# Patient Record
Sex: FEMALE | Race: AMERICAN INDIAN OR ALASKA NATIVE
[De-identification: names, ages, dates, MRNs, and addresses within clinical notes are randomized per-mention and may not be internally consistent; named-entity substitution may affect disease eponyms.]

---

## 2019-11-19 ENCOUNTER — HOSPITAL ENCOUNTER (EMERGENCY)
Dept: HOSPITAL 5 - ED | Age: 29
Discharge: HOME | End: 2019-11-19
Payer: MEDICAID

## 2019-11-19 VITALS — SYSTOLIC BLOOD PRESSURE: 114 MMHG | DIASTOLIC BLOOD PRESSURE: 82 MMHG

## 2019-11-19 DIAGNOSIS — O26.893: Primary | ICD-10-CM

## 2019-11-19 DIAGNOSIS — R51: ICD-10-CM

## 2019-11-19 DIAGNOSIS — Z3A.33: ICD-10-CM

## 2019-11-19 DIAGNOSIS — Z79.899: ICD-10-CM

## 2019-11-19 PROCEDURE — 99282 EMERGENCY DEPT VISIT SF MDM: CPT

## 2019-11-19 PROCEDURE — 96374 THER/PROPH/DIAG INJ IV PUSH: CPT

## 2019-11-19 PROCEDURE — 96375 TX/PRO/DX INJ NEW DRUG ADDON: CPT

## 2025-02-03 NOTE — EMERGENCY DEPARTMENT REPORT
ED Headache HPI





- General


Chief Complaint: Headache


Stated Complaint: HEADACHE  FAST HEARTBEAT


Time Seen by Provider: 11/19/19 05:00





- History of Present Illness


Initial Comments: 





Patient is a 28-year-old female presents emergency room with complaints of a 

right temple headache that began yesterday.  She states that she has been taking

Tylenol which relieves the discomfort but then it returns.  She denies any 

history of migraines.  She denies any nausea, vomiting, vision changes, 

numbness, weakness, gait disturbance, speech disturbance, abdominal pain, 

vaginal bleeding.  She states she is currently 33 weeks pregnant.  She states 

that she is followed by an OB/GYN at [facility]


Allergies/Adverse Reactions: 


Allergies





seafood Allergy (Uncoded 11/19/19 04:49)


   Anaphylaxis








Home Medications: 


Ambulatory Orders





Metoclopramide [Reglan] 10 mg PO Q8HR PRN #10 tab 11/19/19 


diphenhydrAMINE [Benadryl CAP] 25 mg PO Q8HR PRN #10 capsule 11/19/19 











ED Review of Systems


ROS: 


Stated complaint: HEADACHE  FAST HEARTBEAT


Other details as noted in HPI





Comment: All other systems reviewed and negative





ED Past Medical Hx





- Past Medical History


Previous Medical History?: No


Additional medical history: Miscarriage x3





- Surgical History


Past Surgical History?: No





- Social History


Smoking Status: Never Smoker


Substance Use Type: None





- Medications


Home Medications: 


                                Home Medications











 Medication  Instructions  Recorded  Confirmed  Last Taken  Type


 


Metoclopramide [Reglan] 10 mg PO Q8HR PRN #10 tab 11/19/19  Unknown Rx


 


diphenhydrAMINE [Benadryl CAP] 25 mg PO Q8HR PRN #10 capsule 11/19/19  Unknown 

Rx














ED Physical Exam





- General


Limitations: No Limitations


General appearance: alert, in no apparent distress





- Head


Head exam: Present: atraumatic, normocephalic





- Eye


Eye exam: Present: normal appearance, PERRL, EOMI





- ENT


ENT exam: Present: mucous membranes moist





- Respiratory


Respiratory exam: Present: normal lung sounds bilaterally.  Absent: respiratory 

distress, wheezes, rales, rhonchi, stridor, chest wall tenderness, accessory 

muscle use, decreased breath sounds, prolonged expiratory





- Cardiovascular


Cardiovascular Exam: Present: regular rate, normal rhythm, normal heart sounds. 

Absent: systolic murmur, diastolic murmur, rubs, gallop





- Neurological Exam


Neurological exam: Present: alert, oriented X3, CN II-XII intact, normal gait, 

other (normal finger to nose, normal heel to shin, equal  strength, 5/5 

strength in the BUE/BLE, sensation intact throughout, no focal neuro deficit).  

Absent: motor sensory deficit





- Psychiatric


Psychiatric exam: Present: normal affect, normal mood





- Skin


Skin exam: Present: warm, dry, intact





ED Course


                                   Vital Signs











  11/19/19 11/19/19





  04:47 06:31


 


Temperature 98.3 F 


 


Pulse Rate 96 H 93 H


 


Respiratory 18 17





Rate  


 


Blood Pressure 114/82 


 


O2 Sat by Pulse 99 99





Oximetry  














ED Medical Decision Making





- Medical Decision Making





Patient is a 28-year-old female presents emergency room with complaints of a 

right temple headache that began yesterday.  She states that she has been taking

Tylenol which relieves the discomfort but then it returns.  She denies any 

history of migraines.  She denies any nausea, vomiting, vision changes, 

numbness, weakness, gait disturbance, speech disturbance, abdominal pain, 

vaginal bleeding.  She states she is currently 33 weeks pregnant.  She states 

that she is followed by an OB/GYN at [facility].  Vitals are stable.  No 

neurological deficits on exam.  Patient given IV fluids, Reglan, Benadryl, 

Tylenol.  Patient states that her headache has completely resolved and she is 

resting comfortably.  Patient given prescription for reglan and Benadryl. 

advised pt to Please take the 2 medications together as prescribed as needed for

headache.  Increase your fluid intake over the next several days.  Please 

follow-up with your OB/GYN in the next 2-3 days for reexamination.  Return to 

the emergency room for any new or worsening symptoms.





- Differential Diagnosis


migraine, tension HA, cluster HA, sinusitis


Critical care attestation.: 


If time is entered above; I have spent that time in minutes in the direct care 

of this critically ill patient, excluding procedure time.








ED Disposition


Clinical Impression: 


Headache


Qualifiers:


 Headache type: unspecified Headache chronicity pattern: acute headache 

Intractability: not intractable Qualified Code(s): R51 - Headache





Disposition: DC-01 TO HOME OR SELFCARE


Is pt being admited?: No


Does the pt Need Aspirin: No


Condition: Stable


Instructions:  Migraine Headache (ED)


Additional Instructions: 


Please take the 2 medications together as prescribed as needed for headache.  

Increase your fluid intake over the next several days.  Please follow-up with 

your OB/GYN in the next 2-3 days for reexamination.  Return to the emergency 

room for any new or worsening symptoms.


Prescriptions: 


diphenhydrAMINE [Benadryl CAP] 25 mg PO Q8HR PRN #10 capsule


 PRN Reason: headache


Metoclopramide [Reglan] 10 mg PO Q8HR PRN #10 tab


 PRN Reason: headache


Referrals: 


[facility], [place] [Primary Care Provider] - 2-3 Days


Time of Disposition: 06:13


Print Language: ENGLISH Reports that he was ill over weekend with temperature high 100.4. Congested. Today temperature 98.3. Mask placed. ANC and platelets lower than previous. Message to Dr. Diehl; to add to [facility] if she can see, obtain respiratory viral panel and proceed with treatment.     [facility] saw at chairside and obtained the respiratory viral panel. Specimen labeled and to lab.